# Patient Record
Sex: FEMALE | Race: WHITE | NOT HISPANIC OR LATINO | URBAN - METROPOLITAN AREA
[De-identification: names, ages, dates, MRNs, and addresses within clinical notes are randomized per-mention and may not be internally consistent; named-entity substitution may affect disease eponyms.]

---

## 2021-10-19 ENCOUNTER — EMERGENCY (EMERGENCY)
Facility: HOSPITAL | Age: 51
LOS: 1 days | Discharge: HOME | End: 2021-10-19
Attending: EMERGENCY MEDICINE | Admitting: EMERGENCY MEDICINE
Payer: MEDICAID

## 2021-10-19 VITALS
OXYGEN SATURATION: 100 % | RESPIRATION RATE: 17 BRPM | TEMPERATURE: 98 F | SYSTOLIC BLOOD PRESSURE: 198 MMHG | HEART RATE: 88 BPM | DIASTOLIC BLOOD PRESSURE: 96 MMHG

## 2021-10-19 VITALS
HEART RATE: 65 BPM | RESPIRATION RATE: 16 BRPM | TEMPERATURE: 98 F | SYSTOLIC BLOOD PRESSURE: 144 MMHG | OXYGEN SATURATION: 100 % | DIASTOLIC BLOOD PRESSURE: 82 MMHG

## 2021-10-19 PROCEDURE — 99284 EMERGENCY DEPT VISIT MOD MDM: CPT

## 2021-10-19 RX ORDER — CYCLOPENTOLATE HYDROCHLORIDE 10 MG/ML
1 SOLUTION/ DROPS OPHTHALMIC
Qty: 10 | Refills: 0
Start: 2021-10-19 | End: 2021-10-28

## 2021-10-19 RX ORDER — PREDNISOLONE SODIUM PHOSPHATE 1 %
1 DROPS OPHTHALMIC (EYE)
Qty: 10 | Refills: 0
Start: 2021-10-19 | End: 2021-10-28

## 2021-10-19 RX ORDER — BRIMONIDINE TARTRATE 2 MG/MG
1 SOLUTION/ DROPS OPHTHALMIC
Qty: 10 | Refills: 0
Start: 2021-10-19 | End: 2021-11-17

## 2021-10-19 RX ORDER — ACETAZOLAMIDE 250 MG/1
500 TABLET ORAL ONCE
Refills: 0 | Status: COMPLETED | OUTPATIENT
Start: 2021-10-19 | End: 2021-10-19

## 2021-10-19 RX ORDER — LATANOPROST 0.05 MG/ML
1 SOLUTION/ DROPS OPHTHALMIC; TOPICAL
Qty: 10 | Refills: 0
Start: 2021-10-19 | End: 2021-11-17

## 2021-10-19 RX ORDER — DORZOLAMIDE HYDROCHLORIDE TIMOLOL MALEATE 20; 5 MG/ML; MG/ML
1 SOLUTION/ DROPS OPHTHALMIC
Qty: 10 | Refills: 0
Start: 2021-10-19 | End: 2021-11-17

## 2021-10-19 RX ORDER — METOPROLOL TARTRATE 50 MG
50 TABLET ORAL ONCE
Refills: 0 | Status: COMPLETED | OUTPATIENT
Start: 2021-10-19 | End: 2021-10-19

## 2021-10-19 RX ADMIN — ACETAZOLAMIDE 500 MILLIGRAM(S): 250 TABLET ORAL at 18:26

## 2021-10-19 RX ADMIN — Medication 50 MILLIGRAM(S): at 13:22

## 2021-10-19 NOTE — ED PROVIDER NOTE - CLINICAL SUMMARY MEDICAL DECISION MAKING FREE TEXT BOX
nedra: seen in ed, my note above, pt with red eye right side, increased pressure bilat, left is 42, right is 30, decreased visual acutiy. seen by ophtho, concern for glaucoma/ neovasularizartion.  pt communication in Austrian to explain that she risks vision loss completely.  pt has good insight into issues.  as per pmd, follows once a year, she was offered lab work to eval a1c, creatinine.  she states she had labs done 3 months ago, were all ok, a1c in 5's, she refuses labs done here.  she also refuses iv, as pressures not decreasing with drops as per ophtho pt to have diamox po.    signing out awaiting further pressure check.  plan is for pt to follow with ophtho tomorrow.  signed out to Dr. Stephen.

## 2021-10-19 NOTE — CONSULT NOTE ADULT - ATTENDING COMMENTS
I have interviewed and examined the patient and reviewed the residents note including the history, exam, assessment, and plan.  I agree with the residents assessment and plan.    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

## 2021-10-19 NOTE — ED ADULT TRIAGE NOTE - CHIEF COMPLAINT QUOTE
pt sent in by optometrist for pain and swelling to right eye. pt arrives with son who is being aggressive with triage staff.

## 2021-10-19 NOTE — ED PROVIDER NOTE - PATIENT PORTAL LINK FT
You can access the FollowMyHealth Patient Portal offered by [facility] by registering at the following website: [email or web address]. By joining [facility]’s FollowMyHealth portal, you will also be able to view your health information using other applications (apps) compatible with our system.

## 2021-10-19 NOTE — ED PROVIDER NOTE - NSFOLLOWUPINSTRUCTIONS_ED_ALL_ED_FT
- Continue max drop therapy in Both eyes- Cosopt (Dorzolamide, timolol)- twice a day; Brimonidine - Three times a day , Latanoprost (qHS)  - Start Predforte QID and Cyclogyl Three times a day in right eye.  - Blood sugar control as per Medical Team - Extensive discussion with patient regarding need to care for diabetes and specifically to take care of her diabetes as it relates to the eye as her current condition can lead to vision loss, blindness and loss of eye.   - Retinal detachment Precautions: Cautioned for urgent reevaluation if symptoms of curtain vision loss or severe flashing of lights occurs   - Patient will need follow up with retina specialist tomorrow - Patient reports she lives closer to [place] and cant follow up in [place] tomorrow.  - [facility]:00 AM to be seen tomorrow at below:    [facility]  [address] [place] [address]  [phone]

## 2021-10-19 NOTE — CONSULT NOTE ADULT - SUBJECTIVE AND OBJECTIVE BOX
[facility] DEPARTMENT OF OPHTHALMOLOGY - INITIAL ADULT CONSULT  -----------------------------------------------------------------------------------------------------------------  Heri Locke MD PGY 3  Pager: [phone]  -----------------------------------------------------------------------------------------------------------------    HPI: 50 yo Female wiht PMH DM (Likely uncontrolled) , HTN uncontrolled  comes to ED with loss of vision Right eye 1 day. The patient reports Right eye slight pain 3 days. Worsening pain over course of 3 days associated with redness and tearing and then loss of vision yesterday. She reports everything being very blurry, but no COmplete loss of vision, no blackness of vision. All the symptoms are ijn her right eye, but on further questioning reports her left eye with intermittent pain and associated periorbital headache at times. The patient reports she does not follow doctors very often and has sugar levels in th ehigh 100's sometimes 200's. Has not seen an eye doctor recently although has an appointment in 2 weeks with an eye doctor.     Pt. Denies flashes or floater, and double vision.     Interval History: OPhthalmology consut    PAST MEDICAL & SURGICAL HISTORY:  Diabetes    Hypertension    No significant past surgical history      Past Ocular History: Denies GLauc/ AMD    Family History:  FAMILY HISTORY:  Denies Glauc /AMD    Social History: Denies ETOH , SMoking    Ophthalmic Medications: N.A    MEDICATIONS  (STANDING):    MEDICATIONS  (PRN):    Allergies & Intolerances:   Denies     Review of Systems:  Constitutional: No fever, chills  Eyes: As above  Neuro: No tremors  Cardiovascular: No chest pain, palpitations  Respiratory: No SOB, no cough  GI: No nausea, vomiting, abdominal pain  : No dysuria  Skin: no rash  Psych: no depression  Endocrine: no polyuria, polydipsia  Heme/lymph: no swelling    VITALS: T(C): 36.9 (10-19-21 @ 11:22)  T(F): 98.5 (10-19-21 @ 11:22), Max: 98.5 (10-19-21 @ 11:22)  HR: 88 (10-19-21 @ 11:22) (88 - 88)  BP: 198/96 (10-19-21 @ 11:22) (198/96 - 198/96)  RR:  (17 - 17)  SpO2:  (100% - 100%)  Wt(kg): --  General: AAO x 3, appropriate mood and affect    Ophthalmology Exam:  Visual acuity (cc Near): 20/100 ph20/30 (Possible peaking) 20/30 OS.  Pupils: PERRL OU, no APD  Tonometry: 30 42 --> 2 rounds of drops -> 22, 31    Extraocular movements (EOMs): Full OU, no pain, no diplopia.  Confrontational Visual Field (CVF): Difficult to assess OD Full OS  Color Plates:  0/12 9/12 OU.    Slit Lamp Exam (PLE)  External: Normal OU.  Lids/Lashes/Lacrimal Ducts: Flat OU.  Sclera/Conjunctiva: 2-3+ Injection OD 1+ injection OS   Cornea: Clear OU.  Anterior Chamber: tr Cell OD Clear OS  Iris: Florid NVI OU.  Lens: NS OU    Gonio: Open to CB OD OPen to SS OS - NVA OU    Fundus Exam: dilated with 1% tropicamide and 2.5% phenylephrine  Approval obtained from primary team for dilation  Patient aware that pupils can remained dilated for at least 4-6 hours.  Exam performed with 20 D lens    Poor view OD  Bscan OD - Vitreous debris with area of retinal Hyperintensity possibly edema vs Fibrovascular stalk     Vitreous: Vit heme OD wnl OS  Disc, cup/disc: poor view but appears sharp and pink, 0.3 OD sharp and pink .3 No gross NVD OS   Macula: Poor view  exudates seen OD Retinal hemorrhages with Exudates and edema OS  Vessels: Poor view OD Attenuated with tortuosity OS  Periphery: Poor view OD Retinal hemorrhages in all 4 quadrants with areas suspicious for NVE inferiorly OS    Labs/Imaging:  Glucose - 193   [facility] DEPARTMENT OF OPHTHALMOLOGY - INITIAL ADULT CONSULT  -----------------------------------------------------------------------------------------------------------------  Heri Locke MD PGY 3  Pager: [phone]  -----------------------------------------------------------------------------------------------------------------    HPI: 50 yo Female wiht [facility] DM (Likely uncontrolled) , HTN uncontrolled  comes to ED with loss of vision Right eye 1 day. The patient reports Right eye slight pain 3 days. Worsening pain over course of 3 days associated with redness and tearing and then loss of vision yesterday. She reports everything being very blurry, but no COmplete loss of vision, no blackness of vision. All the symptoms are ijn her right eye, but on further questioning reports her left eye with intermittent pain and associated periorbital headache at times. The patient reports she does not follow doctors very often and has sugar levels in th ehigh 100's sometimes 200's. Has not seen an eye doctor recently although has an appointment in 2 weeks with an eye doctor.     H[place]ian interpretor used- [employer]     Pt. Denies flashes or floater, and double vision.       PAST MEDICAL & SURGICAL HISTORY:  Diabetes    Hypertension    No significant past surgical history      Past Ocular History: Denies GLauc/ AMD    Family History:  FAMILY HISTORY:  Denies Glauc /AMD    Social History: Denies ETOH , SMoking    Ophthalmic Medications: none    MEDICATIONS  (STANDING):    MEDICATIONS  (PRN):    Allergies & Intolerances:   Denies     Review of Systems:  Constitutional: No fever, chills  Eyes: As above  Neuro: No tremors  Cardiovascular: No chest pain, palpitations  Respiratory: No SOB, no cough  GI: No nausea, vomiting, abdominal pain  : No dysuria  Skin: no rash  Psych: no depression  Endocrine: no polyuria, polydipsia  Heme/lymph: no swelling    VITALS: T(C): 36.9 (10-19-21 @ 11:22)  T(F): 98.5 (10-19-21 @ 11:22), Max: 98.5 (10-19-21 @ 11:22)  HR: 88 (10-19-21 @ 11:22) (88 - 88)  BP: 198/96 (10-19-21 @ 11:22) (198/96 - 198/96)  RR:  (17 - 17)  SpO2:  (100% - 100%)  Wt(kg): --  General: AAO x 3, appropriate mood and affect    Ophthalmology Exam:  Visual acuity (cc Near): 20/100 ph20/30 (Possible peaking) 20/30 OS.  Pupils: PERRL OU, no APD  Tonometry: 30 42 --> 2 rounds of drops -> 22, 31 -> 16, 30    Extraocular movements (EOMs): Full OU, no pain, no diplopia.  Confrontational Visual Field (CVF): Difficult to assess OD Full OS  Color Plates:  0/12 9/12 OU.    Slit Lamp Exam (PLE)  External: Normal OU.  Lids/Lashes/Lacrimal Ducts: Flat OU.  Sclera/Conjunctiva: 2-3+ Injection OD 1+ injection OS   Cornea: Clear OU.  Anterior Chamber: tr Cell and flare OD Clear OS  Iris: Florid NVI OU.  Lens: NS OU    Gonio: Open to CB, 3+pigment OD open to SS, 3+ pigment OS - NVA OU, no PAS OU    Fundus Exam: dilated with 1% tropicamide and 2.5% phenylephrine  Approval obtained from primary team for dilation  Patient aware that pupils can remained dilated for at least 4-6 hours.  Exam performed with 20 D lens    Poor view OD  Bscan OD - Vitreous debris (heme), ?subhyaloid heme, grossly no RD, no masses    Vitreous: Vit heme OD wnl OS  Disc, cup/disc: poor view but appears sharp and pink, 0.3 OD sharp and pink .3 No gross NVD OS   Macula: Poor view  exudates seen OD Retinal hemorrhages with Exudates and edema OS  Vessels: Poor view OD Attenuated with tortuosity OS  Periphery: Poor view OD Retinal hemorrhages in all 4 quadrants with areas suspicious for NVE inferiorly OS    Labs/Imaging:  Glucose - 193

## 2021-10-19 NOTE — ED PROVIDER NOTE - OBJECTIVE STATEMENT
nedra: pt sent to ED by pmd for sudden loss of vision and redness of right eye.  pt with hx htn and dm, on oral agents for dm, unknown meds, likely bblocker for htn as per her pcp: Dr. Sanchez. ([phone]) he states she follows with him rarely, maybe once a year  as per patient she had some eye itch and pain in the right eye, denies fever, but also notes decrease in vision since yesterday.  with glasses she can't count fingers from right eye at more than 12 inches  bp here in 200's.  giving pt metoprolol 50 mg po.    pharmacy of hers called, losartan 50 mg qd is her usual dose        denies N/V/abd pain/ HA/ fever/ chest pain/ SOB/ dysuria/ urgency/ vaginal dc/ extremity issue

## 2021-10-19 NOTE — CONSULT NOTE ADULT - ASSESSMENT
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     # Neovascular Glaucoma    # Proliferative Diabetic Retinopathy (PDR)  - Known Chronic History of diabetes - Likely uncontrolled  - Severe PDR on exam with NVI bilateral, Vit heme Left and Likely NVE Right  - No tears, Holes or Detachment seen - Bscan done right - No RD seen, possible Fibrovascular bundle   - IOP 30, 42 --> 2 rounds -> 22, 31 --> 3 rounds ->   - Continue max drop therapy Both eyes- Cosopt (Dorzolamide, timolol)- BID Brimonidine - TID , Latanoprost (qHS)   - Start Predforte QID and Cyclogyl TID in right eye.   - Blood sugar control as per Medical Team - Extensive discussion with patient regarding need to care for diabetes and specifically to take care of her diabetes as it relates to the eye as her current condition can lead to vision loss, blindness and loss of eye.   - Retinal detachment Precautions: Cautioned for urgent reevaluation if symptoms of curtain vision loss or severe flashing of lights occurs   - Patient will need follow up with retina specialist tomorrow at [address] [facility].      [address] 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     # Neovascular Glaucoma  Bilateral   # Proliferative Diabetic Retinopathy (PDR) - Bilateral - Vit heme right  - Known Chronic History of diabetes - Likely uncontrolled  - Severe PDR on exam with NVI bilateral, Vit heme Left and Likely NVE Right  - No tears, Holes or Detachment seen - Bscan done right - No RD seen, possible Fibrovascular bundle  - Vit heme Right - HOB Elevation, ho heavy lifting, straining, avoid NSAID if possible   - IOP 30, 42 --> 2 rounds -> 22, 31 --> 3 rounds -> 18, 30 - PO Diamox ->    - Continue max drop therapy Both eyes- Cosopt (Dorzolamide, timolol)- BID Brimonidine - TID , Latanoprost (qHS)   - Start Predforte QID and Cyclogyl TID in right eye.  - Blood sugar control as per Medical Team - Extensive discussion with patient regarding need to care for diabetes and specifically to take care of her diabetes as it relates to the eye as her current condition can lead to vision loss, blindness and loss of eye.   - Retinal detachment Precautions: Cautioned for urgent reevaluation if symptoms of curtain vision loss or severe flashing of lights occurs   - Patient will need follow up with retina specialist tomorrow - Patient reports she lives closer to [place] and cant follow up in [place] tomorrow.  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     # Neovascular Glaucoma  Bilateral   # Proliferative Diabetic Retinopathy (PDR) - Bilateral - Vit heme right  - Known Chronic History of diabetes - Likely uncontrolled  - Severe PDR on exam with NVI bilateral, Vit heme Left and Likely NVE Right  - No tears, Holes or Detachment seen - Bscan done right - No RD seen, possible Fibrovascular bundle  - Vit heme Right - HOB Elevation, ho heavy lifting, straining, avoid NSAID if possible   - IOP 30, 42 --> 2 rounds -> 22, 31 --> 3 rounds -> 18, 30 - PO Diamox ->  18, 28  - Continue max drop therapy Both eyes- Cosopt (Dorzolamide, timolol)- BID Brimonidine - TID , Latanoprost (qHS)   - Patient refusing blood work at this time - Given response to drops improved IOP will not start on PO diamox after discharge, will monitor on drops and can consider starting PO diamox as outpatient with doing lab work as outpatient.   - Start Predforte QID and Cyclogyl TID in right eye.  - Blood sugar control as per Medical Team - Extensive discussion with patient regarding need to care for diabetes and specifically to take care of her diabetes as it relates to the eye as her current condition can lead to vision loss, blindness and loss of eye.   - Retinal detachment Precautions: Cautioned for urgent reevaluation if symptoms of curtain vision loss or severe flashing of lights occurs   - Patient will need follow up with retina specialist tomorrow - Patient reports she lives closer to [place] and cant follow up in [place] tomorrow.  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     # Neovascular Glaucoma  Bilateral   # Proliferative Diabetic Retinopathy (PDR) - Bilateral - Vit heme right  - Known Chronic History of diabetes - Likely uncontrolled  - Severe PDR on exam with NVI bilateral, Vit heme Left and Likely NVE Right  - No tears, Holes or Detachment seen - Bscan done right - No RD seen, possible Fibrovascular bundle  - Vit heme Right - HOB Elevation, ho heavy lifting, straining, avoid NSAID if possible   - IOP 30, 42 --> 2 rounds -> 22, 31 --> 3 rounds -> 18, 30 - PO Diamox ->  18, 28  - Continue max drop therapy Both eyes- Cosopt (Dorzolamide, timolol)- BID Brimonidine - TID , Latanoprost (qHS)   - Patient refusing blood work at this time - Given response to drops improved IOP will not start on PO diamox after discharge, will monitor on drops and can consider starting PO diamox as outpatient with doing lab work as outpatient.   - Start Predforte QID and Cyclogyl TID in right eye.  - Blood sugar control as per Medical Team - Extensive discussion with patient regarding need to care for diabetes and specifically to take care of her diabetes as it relates to the eye as her current condition can lead to permanent vision loss, blindness and loss of eye.   - Retinal detachment Precautions: Cautioned for urgent reevaluation if symptoms of curtain vision loss or severe flashing of lights occurs   - Patient will need follow up with retina specialist tomorrow - Patient reports she lives closer to [place] and cant follow up in [place] tomorrow.  - Will set her up outpatinet follow up tomorrow at [facility] 9:00 AM to be seen tomorrow at below:    HARRIET Rosario    [facility]  [address] [facility]   [address] [place] [address]  [phone]     Heri Locke MD, PGY-3  Pager: [phone]  Also available on [employer] Teams

## 2021-10-19 NOTE — ED PROVIDER NOTE - PHYSICAL EXAMINATION
pt alert and can phonate well (in Congolese)  h at/nc  right eye: injected conjuntiva, no pain with light to eye, no photophobia, pupils poorly responsive bilaterally, pt can identify fingers at 12 inches on right eye but not further.  she states vision in left eye is her normal  neck supple  cor rrr pos s1s2  lungs clear to asno wheeze  abd soft no r/g/t  ext no edema no deformities  neueo awake, lucid normal gait moves all extremities with strength  psych normal affect  vsbp: 203/ 113

## 2025-04-12 NOTE — CONSULT NOTE ADULT - CONSULT REQUESTED BY NAME
"EMERGENCY DEPARTMENT ENCOUNTER      NAME: Nani Perez  AGE: 65 year old female  YOB: 1959  MRN: [id number]  EVALUATION DATE & TIME: No admission date for patient encounter.    PCP: Maximilian Garcia    ED PROVIDER: Sukhdev Saldaña [facility] on 10/7/pending.    MIPS (CTPE, Dental pain, [place], [employer] Documentation:   CMS Diagnoses: None      I, Chaparrita De La Cruz, am serving as a scribe to document services personally performed by Sukhdev Saldaña MD, based on my observation and the provider's statements to me. I, Sukhdev Saldaña MD attest that Chaparrita De La Cruz is acting in a scribe capacity, has observed my performance of the services and has documented them in accordance with my direction.    Sukhdev Saldaña M.D.  Emergency Medicine  [facility] EMERGENCY ROOM  [address] [facility] [address] [phone]  Dept: [phone]     Sukhdev Saldaña MD  04/12/25 [id number]